# Patient Record
Sex: MALE | URBAN - METROPOLITAN AREA
[De-identification: names, ages, dates, MRNs, and addresses within clinical notes are randomized per-mention and may not be internally consistent; named-entity substitution may affect disease eponyms.]

---

## 2019-07-23 ENCOUNTER — APPOINTMENT (OUTPATIENT)
Age: 49
Setting detail: DERMATOLOGY
End: 2019-07-23

## 2019-07-23 VITALS — WEIGHT: 315 LBS | HEIGHT: 72 IN

## 2019-07-23 DIAGNOSIS — L23.7 ALLERGIC CONTACT DERMATITIS DUE TO PLANTS, EXCEPT FOOD: ICD-10-CM

## 2019-07-23 PROCEDURE — OTHER PRESCRIPTION: OTHER

## 2019-07-23 PROCEDURE — 99202 OFFICE O/P NEW SF 15 MIN: CPT

## 2019-07-23 PROCEDURE — OTHER PRESCRIPTION MEDICATION MANAGEMENT: OTHER

## 2019-07-23 PROCEDURE — OTHER COUNSELING: OTHER

## 2019-07-23 RX ORDER — FLUOCINONIDE 0.5 MG/G
CREAM TOPICAL
Qty: 1 | Refills: 0 | Status: ERX | COMMUNITY
Start: 2019-07-23

## 2019-07-23 ASSESSMENT — LOCATION ZONE DERM: LOCATION ZONE: FACE

## 2019-07-23 ASSESSMENT — LOCATION DETAILED DESCRIPTION DERM
LOCATION DETAILED: LEFT SUPERIOR MEDIAL BUCCAL CHEEK
LOCATION DETAILED: LEFT INFERIOR LATERAL MALAR CHEEK

## 2019-07-23 ASSESSMENT — LOCATION SIMPLE DESCRIPTION DERM: LOCATION SIMPLE: LEFT CHEEK

## 2019-07-23 NOTE — PROCEDURE: PRESCRIPTION MEDICATION MANAGEMENT
Continue Regimen: Prednisone prescribed by Urgent care provider.
Render In Strict Bullet Format?: Yes
Detail Level: Zone
Initiate Treatment: Zyrtec per package directions

## 2020-08-04 ENCOUNTER — OFFICE VISIT (OUTPATIENT)
Dept: FAMILY MEDICINE | Facility: CLINIC | Age: 50
End: 2020-08-04

## 2020-08-04 VITALS
HEART RATE: 70 BPM | BODY MASS INDEX: 35.56 KG/M2 | TEMPERATURE: 97.6 F | SYSTOLIC BLOOD PRESSURE: 128 MMHG | RESPIRATION RATE: 22 BRPM | DIASTOLIC BLOOD PRESSURE: 88 MMHG | WEIGHT: 254 LBS | OXYGEN SATURATION: 98 % | HEIGHT: 71 IN

## 2020-08-04 DIAGNOSIS — R10.32 GROIN PAIN, LEFT: Primary | ICD-10-CM

## 2020-08-04 DIAGNOSIS — Z76.89 HEALTH CARE HOME: ICD-10-CM

## 2020-08-04 DIAGNOSIS — Z71.89 ACP (ADVANCE CARE PLANNING): ICD-10-CM

## 2020-08-04 PROCEDURE — 99202 OFFICE O/P NEW SF 15 MIN: CPT | Performed by: FAMILY MEDICINE

## 2020-08-04 RX ORDER — TRIAMCINOLONE ACETONIDE 5 MG/G
CREAM TOPICAL
COMMUNITY
Start: 2019-07-20 | End: 2020-08-04

## 2020-08-04 RX ORDER — ATORVASTATIN CALCIUM 20 MG/1
20 TABLET, FILM COATED ORAL
COMMUNITY
Start: 2019-06-25

## 2020-08-04 ASSESSMENT — MIFFLIN-ST. JEOR: SCORE: 2031.33

## 2020-08-04 ASSESSMENT — ANXIETY QUESTIONNAIRES
6. BECOMING EASILY ANNOYED OR IRRITABLE: NOT AT ALL
IF YOU CHECKED OFF ANY PROBLEMS ON THIS QUESTIONNAIRE, HOW DIFFICULT HAVE THESE PROBLEMS MADE IT FOR YOU TO DO YOUR WORK, TAKE CARE OF THINGS AT HOME, OR GET ALONG WITH OTHER PEOPLE: NOT DIFFICULT AT ALL
2. NOT BEING ABLE TO STOP OR CONTROL WORRYING: NOT AT ALL
7. FEELING AFRAID AS IF SOMETHING AWFUL MIGHT HAPPEN: NOT AT ALL
3. WORRYING TOO MUCH ABOUT DIFFERENT THINGS: NOT AT ALL
5. BEING SO RESTLESS THAT IT IS HARD TO SIT STILL: NOT AT ALL
GAD7 TOTAL SCORE: 0
1. FEELING NERVOUS, ANXIOUS, OR ON EDGE: NOT AT ALL

## 2020-08-04 ASSESSMENT — PATIENT HEALTH QUESTIONNAIRE - PHQ9
5. POOR APPETITE OR OVEREATING: NOT AT ALL
SUM OF ALL RESPONSES TO PHQ QUESTIONS 1-9: 0

## 2020-08-04 NOTE — LETTER
[facility]  [address]  [address]  [facility] [address]  [phone]      August 4, 2020      Ace Evans  [address] [facility] [address]      EMERGENCY CARE PLAN  Presenting Problem Treatment Plan   Questions or concerns during clinic hours I will call the clinic directly:    [facility]  [address] , [address]  [place] [address]  [phone]   Questions or concerns outside clinic hours  I will call the 24 hour line at [phone]   Patient needs to schedule an appointment  I will call the  scheduling line at [phone]   Same day treatment   I will call the clinic first, then  urgent care and/or  express care if needed   Clinic Care Coordinators Loree Rodriguez RN:  [phone]  [facility] Clinical Support Staff: [phone]    Crisis Services:  Behavioral or Mental Health P ([facility])   [phone]   Emergency treatment--Immediately CALL [phone]

## 2020-08-04 NOTE — PROGRESS NOTES
"Subjective    Ace Evans is a 49 year old male who presents to clinic today   because of:  Mass (lump in groin area, has been around for about a year, starting to feel a \"pull\" more when exercising) and New Patient (new patient to our clinic )     HPI   No dysuria  Maybe slight swelling and pulling sensation          Review of Systems  Constitutional, eye, ENT, skin, respiratory, cardiac, and GI are normal except as otherwise noted.    Problem List  Patient Active Problem List    Diagnosis Date Noted     ACP (advance care planning) 08/04/2020     Priority: Medium     Copy of healthcare directive was given to patient.          Health Care Home 08/04/2020     Priority: Medium      Medications  atorvastatin (LIPITOR) 20 MG tablet, Take 20 mg by mouth  ACETAMINOPHEN PO, Take 325 mg by mouth every 6 hours as needed.  Ibuprofen 200 MG capsule, Take 400 mg by mouth every 4 hours as needed.    No current facility-administered medications on file prior to visit.     Allergies  No Known Allergies  Reviewed and updated as needed this visit by Provider           Objective    /88 (BP Location: Right arm, Patient Position: Sitting, Cuff Size: Adult Large)   Pulse 70   Temp 97.6  F (36.4  C)   Resp 22   Ht 1.791 m (5' 10.5\")   Wt 115.2 kg (254 lb)   SpO2 98%   BMI 35.93 kg/m    Normalized weight-for-age data not available for patients older than 20 years.    Physical Exam  Alert and oriented no distress  Abd benign   :Testicles descended no masses  Left sided swelling but no mass or true lump  Some abd wall bulging with coughing     Diagnostics: None      Assessment & Plan    (R10.32) Groin pain, left  (primary encounter diagnosis)  Comment: abd wall weakness but no hernia  Plan: ice therapy    (Z71.89) ACP (advance care planning)  Comment:   Plan:     (Z76.89) Health Care Home  Comment:   Plan:     If not improving or if worsening    Carter eCdillo MD      "

## 2020-08-04 NOTE — NURSING NOTE
"Chief Complaint   Patient presents with     Mass     lump in groin area, has been around for about a year, starting to feel a \"pull\" more when exercising     Pre-visit Screening:  Immunizations:  up to date  Colonoscopy:  NA  Mammogram: na  Asthma Action Test/Plan:  na  PHQ9:  Given today-new patient   GAD7:  Given today-new patient  Questioned patient about current smoking habits Pt. has never smoked.  Ok to leave detailed message on voice mail for today's visit only Yes, phone # [phone]      "

## 2020-08-05 ASSESSMENT — ANXIETY QUESTIONNAIRES: GAD7 TOTAL SCORE: 0

## 2022-04-09 ENCOUNTER — HEALTH MAINTENANCE LETTER (OUTPATIENT)
Age: 52
End: 2022-04-09

## 2022-10-09 ENCOUNTER — HEALTH MAINTENANCE LETTER (OUTPATIENT)
Age: 52
End: 2022-10-09

## 2023-05-21 ENCOUNTER — HEALTH MAINTENANCE LETTER (OUTPATIENT)
Age: 53
End: 2023-05-21

## 2024-06-17 PROBLEM — Z76.89 HEALTH CARE HOME: Status: RESOLVED | Noted: 2020-08-04 | Resolved: 2024-06-17
